# Patient Record
Sex: FEMALE | Race: AMERICAN INDIAN OR ALASKA NATIVE
[De-identification: names, ages, dates, MRNs, and addresses within clinical notes are randomized per-mention and may not be internally consistent; named-entity substitution may affect disease eponyms.]

---

## 2019-02-03 ENCOUNTER — HOSPITAL ENCOUNTER (INPATIENT)
Dept: HOSPITAL 5 - ED | Age: 49
LOS: 4 days | Discharge: HOME | DRG: 292 | End: 2019-02-07
Attending: INTERNAL MEDICINE | Admitting: INTERNAL MEDICINE
Payer: MEDICARE

## 2019-02-03 DIAGNOSIS — Z79.899: ICD-10-CM

## 2019-02-03 DIAGNOSIS — I27.20: ICD-10-CM

## 2019-02-03 DIAGNOSIS — I50.23: ICD-10-CM

## 2019-02-03 DIAGNOSIS — E83.42: ICD-10-CM

## 2019-02-03 DIAGNOSIS — Z98.51: ICD-10-CM

## 2019-02-03 DIAGNOSIS — Z79.82: ICD-10-CM

## 2019-02-03 DIAGNOSIS — M25.511: ICD-10-CM

## 2019-02-03 DIAGNOSIS — R18.8: ICD-10-CM

## 2019-02-03 DIAGNOSIS — I11.0: Primary | ICD-10-CM

## 2019-02-03 DIAGNOSIS — I42.0: ICD-10-CM

## 2019-02-03 DIAGNOSIS — E87.6: ICD-10-CM

## 2019-02-03 DIAGNOSIS — Z82.49: ICD-10-CM

## 2019-02-03 LAB
ALBUMIN SERPL-MCNC: 4 G/DL (ref 3.9–5)
ALT SERPL-CCNC: 20 UNITS/L (ref 7–56)
BILIRUB UR QL STRIP: (no result)
BLOOD UR QL VISUAL: (no result)
BUN SERPL-MCNC: 11 MG/DL (ref 7–17)
BUN/CREAT SERPL: 11 %
CALCIUM SERPL-MCNC: 9.4 MG/DL (ref 8.4–10.2)
HCT VFR BLD CALC: 31.4 % (ref 30.3–42.9)
HEMOLYSIS INDEX: 3
HGB BLD-MCNC: 9.2 GM/DL (ref 10.1–14.3)
MCHC RBC AUTO-ENTMCNC: 29 % (ref 30–34)
MCV RBC AUTO: 69 FL (ref 79–97)
MUCOUS THREADS #/AREA URNS HPF: (no result) /HPF
PH UR STRIP: 5 [PH] (ref 5–7)
PLATELET # BLD: 189 K/MM3 (ref 140–440)
PROT UR STRIP-MCNC: >500 MG/DL
RBC # BLD AUTO: 4.55 M/MM3 (ref 3.65–5.03)
RBC #/AREA URNS HPF: 3 /HPF (ref 0–6)
UROBILINOGEN UR-MCNC: 2 MG/DL (ref ?–2)
WBC #/AREA URNS HPF: 1 /HPF (ref 0–6)

## 2019-02-03 PROCEDURE — 93306 TTE W/DOPPLER COMPLETE: CPT

## 2019-02-03 PROCEDURE — 83880 ASSAY OF NATRIURETIC PEPTIDE: CPT

## 2019-02-03 PROCEDURE — 85027 COMPLETE CBC AUTOMATED: CPT

## 2019-02-03 PROCEDURE — 93010 ELECTROCARDIOGRAM REPORT: CPT

## 2019-02-03 PROCEDURE — 93005 ELECTROCARDIOGRAM TRACING: CPT

## 2019-02-03 PROCEDURE — 82550 ASSAY OF CK (CPK): CPT

## 2019-02-03 PROCEDURE — 82553 CREATINE MB FRACTION: CPT

## 2019-02-03 PROCEDURE — 85379 FIBRIN DEGRADATION QUANT: CPT

## 2019-02-03 PROCEDURE — 83735 ASSAY OF MAGNESIUM: CPT

## 2019-02-03 PROCEDURE — 71275 CT ANGIOGRAPHY CHEST: CPT

## 2019-02-03 PROCEDURE — 71045 X-RAY EXAM CHEST 1 VIEW: CPT

## 2019-02-03 PROCEDURE — 84484 ASSAY OF TROPONIN QUANT: CPT

## 2019-02-03 PROCEDURE — 87116 MYCOBACTERIA CULTURE: CPT

## 2019-02-03 PROCEDURE — 80053 COMPREHEN METABOLIC PANEL: CPT

## 2019-02-03 PROCEDURE — 85025 COMPLETE CBC W/AUTO DIFF WBC: CPT

## 2019-02-03 PROCEDURE — 36415 COLL VENOUS BLD VENIPUNCTURE: CPT

## 2019-02-03 PROCEDURE — 80048 BASIC METABOLIC PNL TOTAL CA: CPT

## 2019-02-03 PROCEDURE — 81001 URINALYSIS AUTO W/SCOPE: CPT

## 2019-02-03 NOTE — XRAY REPORT
FINAL REPORT



EXAM:  XR CHEST 1V AP



HISTORY:  Dyspnea 



COMPARISON:  None available. 



FINDINGS:  

Frontal view(s) of the chest obtained. Moderate severe cardiac silhouette enlargement. Lungs are ezequiel
sly clear. No pneumothorax. 



IMPRESSION:  

Prominent enlargement of the cardiac silhouette. Pericardial effusion is not excluded. Lungs are ezequiel
sly clear.

## 2019-02-03 NOTE — EMERGENCY DEPARTMENT REPORT
ED Shortness of Breath HPI





- General


Chief Complaint: Nausea/Vomiting/Diarrhea


Stated Complaint: N/V/D


Time Seen by Provider: 02/03/19 17:38


Source: EMS


Mode of arrival: Wheelchair


Limitations: No Limitations





- History of Present Illness


Initial Comments: 





Patient is a 43-year-old emergency room with complaints of nausea vomiting 

diarrhea, lower extremity swelling, shortness of breath, weakness and chest pain

rating to her right shoulder and abdominal distention 1 week.  Patient states 

her symptoms are worsening.  Patient states her chest pain is a 10 out of 10 and

is radiating to her right shoulder.  Patient states stabbing pain.  Patient 

states the pain is better with rest and worse with exertion and movement.  

Patient states the shortness of breath is severe and is worsening and is better 

with rest and worse with exertion.  Patient states history of CHF.  Patient 

states she hasn't changed her diet or missed any medications.


MD Complaint: shortness of breath, chest pain


-: Sudden


Severity: severe


Pain Scale: 10


Consistency: constant


Improves With: rest


Worsens With: exertion, movement


Known History Of: congestive heart failure


Associated Symptoms: chest pain, lower abdominal swelling, nausea/vomiting


Treatments Prior to Arrival: none





- Related Data


Home Oxygen Therapy: No


                                Home Medications











 Medication  Instructions  Recorded  Confirmed  Last Taken


 


Allopurinol 100 mg PO DAILY 02/04/19 02/04/19 Unknown


 


Aspirin [Aspir-Low] 81 mg PO DAILY 02/04/19 02/04/19 Unknown


 


Carvedilol [Coreg] 3.125 mg PO Q12HR 02/04/19 02/04/19 Unknown


 


Ferrous Sulfate 325 mg PO Q48HR 02/04/19 02/04/19 Unknown


 


Furosemide [Lasix TAB] 20 mg PO DAILY 02/04/19 02/04/19 Unknown


 


Mag-Oxide 400 mg PO DAILY 02/04/19 02/04/19 Unknown











                                    Allergies











Allergy/AdvReac Type Severity Reaction Status Date / Time


 


No Known Allergies Allergy   Unverified 02/03/19 17:35














ED Review of Systems


ROS: 


Stated complaint: N/V/D


Other details as noted in HPI





Constitutional: weakness.  denies: chills, fever


Eyes: denies: eye pain, eye discharge, vision change


ENT: denies: ear pain, throat pain


Respiratory: shortness of breath, SOB with exertion, SOB at rest.  denies: 

cough, wheezing


Cardiovascular: chest pain.  denies: palpitations


Endocrine: no symptoms reported


Gastrointestinal: nausea, vomiting, diarrhea.  denies: abdominal pain


Genitourinary: denies: urgency, dysuria, discharge


Musculoskeletal: denies: back pain, joint swelling, arthralgia


Skin: denies: rash, lesions


Neurological: denies: headache, weakness, paresthesias


Psychiatric: denies: anxiety, depression


Hematological/Lymphatic: denies: easy bleeding, easy bruising





ED Past Medical Hx





- Past Medical History


Previous Medical History?: Yes


Hx Hypertension: Yes


Hx Congestive Heart Failure: Yes


Additional medical history: CJHF





- Surgical History


Past Surgical History?: Yes


Additional Surgical History: BILATERAL ANKLES FX,TUBILIGATION





- Family History


Family history: no significant





- Social History


Smoking Status: Never Smoker


Substance Use Type: None





- Medications


Home Medications: 


                                Home Medications











 Medication  Instructions  Recorded  Confirmed  Last Taken  Type


 


Allopurinol 100 mg PO DAILY 02/04/19 02/04/19 Unknown History


 


Aspirin [Aspir-Low] 81 mg PO DAILY 02/04/19 02/04/19 Unknown History


 


Carvedilol [Coreg] 3.125 mg PO Q12HR 02/04/19 02/04/19 Unknown History


 


Ferrous Sulfate 325 mg PO Q48HR 02/04/19 02/04/19 Unknown History


 


Furosemide [Lasix TAB] 20 mg PO DAILY 02/04/19 02/04/19 Unknown History


 


Mag-Oxide 400 mg PO DAILY 02/04/19 02/04/19 Unknown History














ED Physical Exam





- General


Limitations: No Limitations


General appearance: alert, in no apparent distress





- Head


Head exam: Present: atraumatic, normocephalic





- Eye


Eye exam: Present: normal appearance, PERRL


Pupils: Present: normal accommodation





- ENT


ENT exam: Present: mucous membranes moist





- Neck


Neck exam: Present: normal inspection





- Respiratory


Respiratory exam: Present: normal lung sounds bilaterally.  Absent: respiratory 

distress





- Cardiovascular


Cardiovascular Exam: Present: regular rate, normal rhythm.  Absent: systolic 

murmur, diastolic murmur, rubs, gallop





- GI/Abdominal


GI/Abdominal exam: Present: soft, normal bowel sounds





- Extremities Exam


Extremities exam: Present: full ROM, pedal edema.  Absent: calf tenderness





- Back Exam


Back exam: Present: normal inspection





- Neurological Exam


Neurological exam: Present: alert, oriented X3





- Psychiatric


Psychiatric exam: Present: normal affect, normal mood





- Skin


Skin exam: Present: warm, dry, intact, normal color.  Absent: rash





ED Course


                                   Vital Signs











  02/03/19 02/03/19 02/03/19





  17:28 18:02 18:03


 


Temperature 98.3 F 98.6 F 


 


Pulse Rate 101 H 99 H 


 


Respiratory 20 19 19





Rate   


 


Blood Pressure 109/78  


 


Blood Pressure  99/74 





[Left]   


 


O2 Sat by Pulse 100 100 100





Oximetry   














  02/03/19 02/03/19 02/03/19





  18:46 19:16 23:17


 


Temperature   


 


Pulse Rate 100 H 99 H 99 H


 


Respiratory 16 17 





Rate   


 


Blood Pressure 99/74 109/79 


 


Blood Pressure   





[Left]   


 


O2 Sat by Pulse 98 98 





Oximetry   














  02/03/19





  23:18


 


Temperature 98.6 F


 


Pulse Rate 96 H


 


Respiratory 16





Rate 


 


Blood Pressure 111/76


 


Blood Pressure 





[Left] 


 


O2 Sat by Pulse 96





Oximetry 














- Reevaluation(s)


Reevaluation #1: 


discussed all results with patient.  Patient admitted to the hospitalist 

service.  Patient agrees with plan of care and admission.


02/03/19 21:33








- Consultations


Consultation #1: 


Hospital was consulted for admission.  Hospitalist to admit patient.  

Hospitalist to assume care patient.


02/03/19 21:34








ED Medical Decision Making





- Lab Data


Result diagrams: 


                                 02/03/19 17:58





                                 02/03/19 17:58





- EKG Data


-: EKG Interpreted by Me


EKG shows normal: sinus rhythm, axis, intervals, QRS complexes, ST-T waves


Rate: tachycardia





- Radiology Data


Radiology results: report reviewed, image reviewed


interpreted by me: 





Cardiomegaly noted on chest x-ray. no acute findings





FINAL REPORT 





EXAM: CT ANGIO CHEST 





HISTORY: sob 





COMPARISON: Chest x-ray from the same date. 





TECHNIQUE: Contiguous axial images were obtained. Additional sagittal and 

coronal reformatted 


images were obtained. Administration of IV contrast given per institution 

protocol. Images submitted


for interpretation. Max intensity projection images. 100 cc Omnipaque 350. 





FINDINGS: 


Prominent cardiac enlargement. Small pericardial effusion measuring 1.5 

centimeters in greatest 


thickness. No pulmonary embolus. No pathologically enlarged intrathoracic or 

axillary lymph nodes. 





Tracheobronchial tree is patent. Mild linear atelectasis at the lung bases. 

Trace right-sided 


pleural effusion. Mild-to-moderate degenerative changes of the thoracic spine. 

Mild diffuse body 


wall edema. Small to moderate amount of free fluid in the visualized upper 

abdomen. Reflux of 


contrast with hepatic veins concerning for right ventricular dysfunction. 

Nodular thickening of 


adrenal glands. 





IMPRESSION: 


No pulmonary embolus. 





Prominent cardiac enlargement with small pericardial effusion. 





Probable minimal basilar congestion. Mild septal thickening lung bases and trace

 right-sided 


pleural effusion. 





Mild diffuse body wall edema and small to moderate amount of ascites in the 

upper abdomen. 





- Medical Decision Making





He is a 48-year-old female that presents emergency room with multiple 

complaints.  Patient found to being a CHF exacerbation.  Patient was also found 

to have elevated d-dimer and with the symptoms of shortness of breath a CT scan 

of the lungs was done.  CTA was negative for PE.  Chest x-ray shows 

cardiomegaly.  BNP is elevated.  Patient has complained of lower cavity edema.





- Differential Diagnosis


sob. cp chf exac. volume overload. edema


Critical Care Time: Yes


Critical care attestation.: 


If time is entered above; I have spent that time in minutes in the direct care 

of this critically ill patient, excluding procedure time.





Critical Care Time: 





45 minutes





ED Disposition


Clinical Impression: 


 SOB (shortness of breath), Leg edema, Elevated d-dimer, Elevated brain 

natriuretic peptide (BNP) level





Chest pain


Qualifiers:


 Chest pain type: unspecified Qualified Code(s): R07.9 - Chest pain, unspecified





CHF (congestive heart failure)


Qualifiers:


 Heart failure type: unspecified Heart failure chronicity: acute on chronic Qu

alified Code(s): I50.9 - Heart failure, unspecified





Disposition: DC-09 OP ADMIT IP TO THIS HOSP


Is pt being admited?: Yes


Does the pt Need Aspirin: No


Condition: Critical


Time of Disposition: 21:27

## 2019-02-03 NOTE — CAT SCAN REPORT
FINAL REPORT



EXAM:  CT ANGIO CHEST



HISTORY:  sob 



COMPARISON:  Chest x-ray from the same date. 



TECHNIQUE:  Contiguous axial images were obtained. Additional sagittal and coronal reformatted images
 were obtained. Administration of IV contrast given per institution protocol. Images submitted for in
terpretation. Max intensity projection images. 100 cc Omnipaque 350. 



FINDINGS:  

Prominent cardiac enlargement. Small pericardial effusion measuring 1.5 centimeters in greatest thick
ness. No pulmonary embolus. No pathologically enlarged intrathoracic or axillary lymph nodes. 



Tracheobronchial tree is patent. Mild linear atelectasis at the lung bases. Trace right-sided pleural
 effusion. Mild-to-moderate degenerative changes of the thoracic spine. Mild diffuse body wall edema.
 Small to moderate amount of free fluid in the visualized upper abdomen. Reflux of contrast with hepa
tic veins concerning for right ventricular dysfunction. Nodular thickening of adrenal glands. 



IMPRESSION:  

No pulmonary embolus. 



Prominent cardiac enlargement with small pericardial effusion. 



Probable minimal basilar congestion. Mild septal thickening lung bases and trace right-sided pleural 
effusion. 



Mild diffuse body wall edema and small to moderate amount of ascites in the upper abdomen.

## 2019-02-03 NOTE — HISTORY AND PHYSICAL REPORT
History of Present Illness


Date of examination: 02/03/19


History of present illness: 


48-year-old female with history of CHF, hypertension comes emergency room with 

complaints of swelling in her stomach and lower extremity edema, shortness of 

breath 2 weeks.  Complaining of dyspnea and exertion, orthopnea.  Also complai

edin of right shoulder pain, stabbing, intermittent over 2 hours, no radiation, 

intensity 4/5, cannot identify exacerbating or relieving factors


Review of systems


Constitutional: no  weight loss, chills, fever


Ears, eyes, nose, mouth and throat: no nasal congestion, no nasal discharge, no 

sinus pressure, no vision change, no red eye.


Neck: No neck pain or rigidity.


Cardiovascular: no palpitations, chest pain


Respiratory: no cough, +shortness of breath


Gastrointestinal: no hematochezia, abdominal pain


Genitourinary : no  frequency , no hematuria


Musculoskeletal: no joint swelling or muscle ache 


Integumentary: no rash, no pruritis


Neurological: no parathesias, no focal weakness


Endocrine: no cold or heat intolerance, no polyuria or polydipsia


Hematologic/Lymphatic: no easy bruising, no easy bleeding, no gland swelling


Allergic/Immunologic: no urticaria, no angioedema.





PAST MEDICAL HISTORY: CHF, hypertension 





PAST SURGICAL HISTORY: Tubal ligation, ankle





SOCIAL HISTORY: Denies alcohol,  drugs, tobacco





FAMILY HISTORY: Hypertension








Medications and Allergies


                                    Allergies











Allergy/AdvReac Type Severity Reaction Status Date / Time


 


No Known Allergies Allergy   Unverified 02/03/19 17:35











                                Home Medications











 Medication  Instructions  Recorded  Confirmed  Last Taken  Type


 


Ferrous Sulfate 325 mg PO Q48HR 02/04/19 02/04/19 Unknown History


 


Mag-Oxide 400 mg PO DAILY 02/04/19 02/04/19 Unknown History


 


RX: Allopurinol 100 mg PO DAILY 02/04/19 02/04/19 Unknown History


 


RX: Aspirin [Aspir-Low] 81 mg PO DAILY 02/04/19 02/04/19 Unknown History


 


RX: Carvedilol [Coreg] 3.125 mg PO Q12HR 02/04/19 02/04/19 Unknown History


 


RX: Furosemide [Lasix TAB] 20 mg PO DAILY #30 tablet 02/07/19  Unknown Rx


 


RX: Lisinopril [Zestril TAB] 2.5 mg PO QDAY #30 tab 02/07/19  Unknown Rx


 


RX: Spironolactone [Aldactone] 25 mg PO QDAY #30 tablet 02/07/19  Unknown Rx














Exam





- Physical Exam


Narrative exam: 


General Apperance: The patient lying in bed,  breathing comfortable 


HEENT: Normocephalic, atraumatic.  Pupils equally round and reactive to light, 

EOMI, no sclericterus or JVD or thyromegaly or nodule.  , no carotid bruit, 

mucous membranes moist, no exudate or erythema, +jvd


Heart: S1-S2, regular is rhythm


Lungs: Crackles auscultation bilaterally, breathing comfortable


Abdomen: Positive bowel sounds, soft, nontender, nondistended, no organomegaly


Extremities:+ edema, NO  cyanosis clubbing


Skin: no rash, nodule, warm and dry


Neuro: cranial nerves 2-12 intact, speech is fluent, motor/sensory intact








- Constitutional


Vitals: 


                                        











Temp Pulse Resp BP Pulse Ox


 


 98.6 F   99 H  17   109/79   98 


 


 02/03/19 18:02  02/03/19 19:16  02/03/19 19:16  02/03/19 19:16  02/03/19 19:16














Results





- Labs


CBC & Chem 7: 


                                 02/06/19 04:33





                                 02/06/19 04:33


Labs: 


                              Abnormal lab results











  02/03/19 02/03/19 02/03/19 Range/Units





  17:58 17:58 17:58 


 


Hgb  9.2 L    (10.1-14.3)  gm/dl


 


MCV  69 L    (79-97)  fl


 


MCH  20 L    (28-32)  pg


 


MCHC  29 L    (30-34)  %


 


RDW  22.7 H    (13.2-15.2)  %


 


D-Dimer   1952.64 H   (0-234)  ng/mlDDU


 


Total Bilirubin    3.20 H  (0.1-1.2)  mg/dL


 


NT-Pro-B Natriuret Pep     (0-450)  pg/mL














  02/03/19 Range/Units





  18:06 


 


Hgb   (10.1-14.3)  gm/dl


 


MCV   (79-97)  fl


 


MCH   (28-32)  pg


 


MCHC   (30-34)  %


 


RDW   (13.2-15.2)  %


 


D-Dimer   (0-234)  ng/mlDDU


 


Total Bilirubin   (0.1-1.2)  mg/dL


 


NT-Pro-B Natriuret Pep  8051 H  (0-450)  pg/mL














- Imaging and Cardiology


EKG: image reviewed


Chest x-ray: report reviewed


CT scan - chest: report reviewed





Assessment and Plan


Assessment


CHF exacerbation, acute on chronic, probably diastolic


Hypertension





Plan


Admit to medicine


Diurese with IV Lasix


start Beta blocker, ACE inhibitor, aspirin 


Check cardiac enzymes, echo


Monitor I's and O's, daily weight


Consult cardiology, DVT prophylaxis

## 2019-02-04 LAB
BASOPHILS # (AUTO): 0 K/MM3 (ref 0–0.1)
BASOPHILS NFR BLD AUTO: 0.6 % (ref 0–1.8)
BILIRUB UR QL STRIP: (no result)
BLOOD UR QL VISUAL: (no result)
BUN SERPL-MCNC: 12 MG/DL (ref 7–17)
BUN/CREAT SERPL: 12 %
CALCIUM SERPL-MCNC: 9.1 MG/DL (ref 8.4–10.2)
EOSINOPHIL # BLD AUTO: 0.1 K/MM3 (ref 0–0.4)
EOSINOPHIL NFR BLD AUTO: 0.9 % (ref 0–4.3)
HCT VFR BLD CALC: 29.3 % (ref 30.3–42.9)
HEMOLYSIS INDEX: 0
HGB BLD-MCNC: 8.8 GM/DL (ref 10.1–14.3)
LYMPHOCYTES # BLD AUTO: 1.2 K/MM3 (ref 1.2–5.4)
LYMPHOCYTES NFR BLD AUTO: 19.1 % (ref 13.4–35)
MCHC RBC AUTO-ENTMCNC: 30 % (ref 30–34)
MCV RBC AUTO: 69 FL (ref 79–97)
MONOCYTES # (AUTO): 0.6 K/MM3 (ref 0–0.8)
MONOCYTES % (AUTO): 9.4 % (ref 0–7.3)
MUCOUS THREADS #/AREA URNS HPF: (no result) /HPF
PH UR STRIP: 5 [PH] (ref 5–7)
PLATELET # BLD: 168 K/MM3 (ref 140–440)
PROT UR STRIP-MCNC: (no result) MG/DL
RBC # BLD AUTO: 4.27 M/MM3 (ref 3.65–5.03)
RBC #/AREA URNS HPF: 2 /HPF (ref 0–6)
UROBILINOGEN UR-MCNC: 4 MG/DL (ref ?–2)
WBC #/AREA URNS HPF: < 1 /HPF (ref 0–6)

## 2019-02-04 RX ADMIN — Medication SCH ML: at 21:51

## 2019-02-04 RX ADMIN — FUROSEMIDE SCH MG: 10 INJECTION, SOLUTION INTRAVENOUS at 06:26

## 2019-02-04 RX ADMIN — ASPIRIN SCH MG: 81 TABLET, CHEWABLE ORAL at 09:03

## 2019-02-04 RX ADMIN — CARVEDILOL SCH MG: 3.12 TABLET, FILM COATED ORAL at 21:51

## 2019-02-04 RX ADMIN — FUROSEMIDE SCH MG: 10 INJECTION, SOLUTION INTRAVENOUS at 18:20

## 2019-02-04 RX ADMIN — LISINOPRIL SCH MG: 5 TABLET ORAL at 09:01

## 2019-02-04 RX ADMIN — CARVEDILOL SCH MG: 3.12 TABLET, FILM COATED ORAL at 09:02

## 2019-02-04 RX ADMIN — Medication SCH ML: at 09:02

## 2019-02-04 RX ADMIN — CARVEDILOL SCH: 3.12 TABLET, FILM COATED ORAL at 22:15

## 2019-02-04 RX ADMIN — MILRINONE LACTATE IN DEXTROSE SCH MLS/HR: 200 INJECTION, SOLUTION INTRAVENOUS at 13:35

## 2019-02-04 RX ADMIN — ENOXAPARIN SODIUM SCH MG: 100 INJECTION SUBCUTANEOUS at 09:02

## 2019-02-04 NOTE — CONSULTATION
Addendum entered and electronically signed by TARAH ABREU MD  02/04/19 18:07:







Patient is admitted with heart failure except, fatigue, shortness of breath, low

er extremity edema, ascites.  Patient's echocardiogram demonstrates an end-stage

dilated cardiomyopathy, with left ventricle ejection fraction less than 10%.  In

addition to optimal guideline directed medical therapy, we will add a trial of 

intravenous milrinone.





Original Note:








History of Present Illness


Consult date: 02/04/19


Consult reason: congestive heart failure


History of present illness: 





Patient is a 48 year old woman with a long standing history of dilated c

ardiomyopathy which is followed by [facility]. Patient was brought to this 

hospital with complaints of shortness of breath, abdominal distention, lower 

extremity edema, admitted with CHF exacerbation. Cardiac consultation was 

requested. Patient admits she ran out of lasix 2 weeks ago. She denies chest 

pain and palpitations. Chest CT scan reports evidence of moderate ascities. No 

evidence of PE. An ECG is sinus tachycardia, no acute ischemic changes. 





Medications and Allergies


                                    Allergies











Allergy/AdvReac Type Severity Reaction Status Date / Time


 


No Known Allergies Allergy   Unverified 02/03/19 17:35











                                Home Medications











 Medication  Instructions  Recorded  Confirmed  Last Taken  Type


 


Allopurinol 100 mg PO DAILY 02/04/19 02/04/19 Unknown History


 


Aspirin [Aspir-Low] 81 mg PO DAILY 02/04/19 02/04/19 Unknown History


 


Carvedilol [Coreg] 3.125 mg PO Q12HR 02/04/19 02/04/19 Unknown History


 


Ferrous Sulfate 325 mg PO Q48HR 02/04/19 02/04/19 Unknown History


 


Furosemide [Lasix TAB] 20 mg PO DAILY 02/04/19 02/04/19 Unknown History


 


Mag-Oxide 400 mg PO DAILY 02/04/19 02/04/19 Unknown History











Active Meds: 


Active Medications





Acetaminophen (Tylenol)  650 mg PO Q4H PRN


   PRN Reason: Pain MILD(1-3)/Fever >100.5/HA


   Last Admin: 02/04/19 06:36 Dose:  650 mg


   Documented by: 


Aspirin (Baby Aspirin)  81 mg PO QDAY [facility]


   Last Admin: 02/04/19 09:03 Dose:  81 mg


   Documented by: 


Carvedilol (Coreg)  3.125 mg PO BID [facility]


   Last Admin: 02/04/19 09:02 Dose:  3.125 mg


   Documented by: 


Enoxaparin Sodium (Lovenox)  40 mg SUB-Q QDAY@1000 [facility]


   Last Admin: 02/04/19 09:02 Dose:  40 mg


   Documented by: 


Furosemide (Lasix)  40 mg IV BID@0600,1800 [facility]


   Last Admin: 02/04/19 06:26 Dose:  40 mg


   Documented by: 


Lisinopril (Zestril)  2.5 mg PO QDAY [facility]


   Last Admin: 02/04/19 09:01 Dose:  2.5 mg


   Documented by: 


Ondansetron HCl (Zofran)  4 mg IV Q8H PRN


   PRN Reason: Nausea And Vomiting


Sodium Chloride (Sodium Chloride Flush Syringe 10 Ml)  10 ml IV BID [facility]


   Last Admin: 02/04/19 09:02 Dose:  10 ml


   Documented by: 


Sodium Chloride (Sodium Chloride Flush Syringe 10 Ml)  10 ml IV PRN PRN


   PRN Reason: LINE FLUSH











Physical Examination


                                   Vital Signs











Temp Pulse Resp BP Pulse Ox


 


 98.3 F   101 H  20   109/78   100 


 


 02/03/19 17:28  02/03/19 17:28  02/03/19 17:28  02/03/19 17:28  02/03/19 17:28











General appearance: no acute distress


HEENT: Positive: PERRL


Neck: Positive: trachea midline


Cardiac: Positive: Reg Rate and Rhythm


Lungs: Positive: Decreased Breath Sounds


Neuro: Positive: Grossly Intact


Abdomen: Positive: Distended


Extremities: Present: +1 Edema





Results





                                 02/04/19 04:36





                                 02/04/19 04:36


                                 Cardiac Enzymes











  02/03/19 02/03/19 02/04/19 Range/Units





  17:58 17:58 04:36 


 


AST   26   (5-40)  units/L


 


CK-MB (CK-2)  1.9   1.6  (0.0-4.0)  ng/mL








                                       CBC











  02/03/19 02/04/19 Range/Units





  17:58 04:36 


 


WBC  6.1  6.1  (4.5-11.0)  K/mm3


 


RBC  4.55  4.27  (3.65-5.03)  M/mm3


 


Hgb  9.2 L  8.8 L  (10.1-14.3)  gm/dl


 


Hct  31.4  29.3 L  (30.3-42.9)  %


 


Plt Count  189  168  (140-440)  K/mm3


 


Lymph #  Np  1.2  


 


Mono #  Np  0.6  


 


Eos #  Np  0.1  


 


Baso #  Np  0.0  








                          Comprehensive Metabolic Panel











  02/03/19 02/04/19 Range/Units





  17:58 04:36 


 


Sodium  142  141  (137-145)  mmol/L


 


Potassium  4.3  4.1  (3.6-5.0)  mmol/L


 


Chloride  101.8  102.0  ()  mmol/L


 


Carbon Dioxide  26  26  (22-30)  mmol/L


 


BUN  11  12  (7-17)  mg/dL


 


Creatinine  1.0  1.0  (0.7-1.2)  mg/dL


 


Glucose  94  90  ()  mg/dL


 


Calcium  9.4  9.1  (8.4-10.2)  mg/dL


 


AST  26   (5-40)  units/L


 


ALT  20   (7-56)  units/L


 


Alkaline Phosphatase  59   ()  units/L


 


Total Protein  8.0   (6.3-8.2)  g/dL


 


Albumin  4.0   (3.9-5)  g/dL














Assessment and Plan





Acute systolic heart failure


   s/t to noncompliance with medications


Ascities

## 2019-02-04 NOTE — PROGRESS NOTE
Assessment and Plan


Assessment and plan: 


Patient is a 49 yo woman history of CHF, hypertension who presents to [facility] ED 

with swelling in her stomach and lower extremity edema, shortness of breath 2 

weeks, and orthopnea.  Also complaining of right shoulder pain, stabbing, 

intermittent over 2 hours, no radiation, intensity 4/5, cannot identify ex

acerbating or relieving factors





-CHF exacerbation, acute on chronic, probably diastolic, Diuresis with IV Lasix,

ECHO pending, 


-Right shoulder pains: supportive care


-Hypertension, Check cardiac enzymes,





History


Interval history: 


Patient was seen and examined. Follow-up on current diagnosis of SOB. Overnight 

uneventful. Patient denies any chest pain, nausea/vomiting or severe headaches. 

Imaging, nursing note, chart, labs and old chart reviewed. Discussed with 

patient. 





Hospitalist Physical





- Physical exam


Narrative exam: 


Gen: ill appearing,  NAD, Awake, Alert, Orientated 


HEENT: NCAT, EOMI, PERRL, OP Clear 


Neck: supple, no adenopathy, no thyromegaly, no JVD 


CVS/Heart: RRR, normal S1S2, pulses present bilaterally 


Chest/Lungs: diminished bs bilateral, Symmetrical chest expansion, good air 

entry bilaterally 


GI/Abdomen: abdominal distension, good bowel sounds, no guarding or rebound 


/Bladder: no suprapubic tenderness, no CVA or paraspinal tenderness 


Extermity/Skin: ble edema, no obvious rash 


MSK: FROM x 4 


Neuro: CN 2-12 grossly intact, no new focal deficits 


Psych: calm 








- Constitutional


Vitals: 


                                        











Temp Pulse Resp BP Pulse Ox


 


 97.9 F   103 H  20   102/68   100 


 


 02/04/19 07:46  02/04/19 07:46  02/04/19 07:46  02/04/19 07:46  02/04/19 10:13











General appearance: Present: no acute distress





Results





- Labs


CBC & Chem 7: 


                                 02/04/19 04:36





                                 02/04/19 04:36


Labs: 


                             Laboratory Last Values











WBC  6.1 K/mm3 (4.5-11.0)   02/04/19  04:36    


 


RBC  4.27 M/mm3 (3.65-5.03)   02/04/19  04:36    


 


Hgb  8.8 gm/dl (10.1-14.3)  L  02/04/19  04:36    


 


Hct  29.3 % (30.3-42.9)  L  02/04/19  04:36    


 


MCV  69 fl (79-97)  L  02/04/19  04:36    


 


MCH  21 pg (28-32)  L  02/04/19  04:36    


 


MCHC  30 % (30-34)   02/04/19  04:36    


 


RDW  22.9 % (13.2-15.2)  H  02/04/19  04:36    


 


Plt Count  168 K/mm3 (140-440)   02/04/19  04:36    


 


Lymph % (Auto)  19.1 % (13.4-35.0)   02/04/19  04:36    


 


Mono % (Auto)  9.4 % (0.0-7.3)  H  02/04/19  04:36    


 


Eos % (Auto)  0.9 % (0.0-4.3)   02/04/19  04:36    


 


Baso % (Auto)  0.6 % (0.0-1.8)   02/04/19  04:36    


 


Lymph #  1.2 K/mm3 (1.2-5.4)   02/04/19  04:36    


 


Mono #  0.6 K/mm3 (0.0-0.8)   02/04/19  04:36    


 


Eos #  0.1 K/mm3 (0.0-0.4)   02/04/19  04:36    


 


Baso #  0.0 K/mm3 (0.0-0.1)   02/04/19  04:36    


 


Seg Neutrophils %  70.0 % (40.0-70.0)   02/04/19  04:36    


 


Seg Neutrophils #  4.3 K/mm3 (1.8-7.7)   02/04/19  04:36    


 


D-Dimer  1952.64 ng/mlDDU (0-234)  H  02/03/19  17:58    


 


Sodium  141 mmol/L (137-145)   02/04/19  04:36    


 


Potassium  4.1 mmol/L (3.6-5.0)   02/04/19  04:36    


 


Chloride  102.0 mmol/L ()   02/04/19  04:36    


 


Carbon Dioxide  26 mmol/L (22-30)   02/04/19  04:36    


 


Anion Gap  17 mmol/L  02/04/19  04:36    


 


BUN  12 mg/dL (7-17)   02/04/19  04:36    


 


Creatinine  1.0 mg/dL (0.7-1.2)   02/04/19  04:36    


 


Estimated GFR  > 60 ml/min  02/04/19  04:36    


 


BUN/Creatinine Ratio  12 %  02/04/19  04:36    


 


Glucose  90 mg/dL ()   02/04/19  04:36    


 


Calcium  9.1 mg/dL (8.4-10.2)   02/04/19  04:36    


 


Total Bilirubin  3.20 mg/dL (0.1-1.2)  H  02/03/19  17:58    


 


AST  26 units/L (5-40)   02/03/19  17:58    


 


ALT  20 units/L (7-56)   02/03/19  17:58    


 


Alkaline Phosphatase  59 units/L ()   02/03/19  17:58    


 


Total Creatine Kinase  95 units/L ()   02/04/19  04:36    


 


CK-MB (CK-2)  1.6 ng/mL (0.0-4.0)   02/04/19  04:36    


 


CK-MB (CK-2) Rel Index  1.6  (0-4)   02/04/19  04:36    


 


Troponin T  < 0.010 ng/mL (0.00-0.029)   02/04/19  04:36    


 


NT-Pro-B Natriuret Pep  8051 pg/mL (0-450)  H  02/03/19  18:06    


 


Total Protein  8.0 g/dL (6.3-8.2)   02/03/19  17:58    


 


Albumin  4.0 g/dL (3.9-5)   02/03/19  17:58    


 


Albumin/Globulin Ratio  1.0 %  02/03/19  17:58    


 


Urine Color  Sabiha  (Yellow)   02/03/19  Unknown


 


Urine Turbidity  Clear  (Clear)   02/03/19  Unknown


 


Urine pH  5.0  (5.0-7.0)   02/03/19  Unknown


 


Ur Specific Gravity  1.027  (1.003-1.030)   02/03/19  Unknown


 


Urine Protein  >500 mg/dL (Negative)   02/03/19  Unknown


 


Urine Glucose (UA)  50 mg/dL (Negative)   02/03/19  Unknown


 


Urine Ketones  Tr mg/dL (Negative)   02/03/19  Unknown


 


Urine Blood  Neg  (Negative)   02/03/19  Unknown


 


Urine Nitrite  Neg  (Negative)   02/03/19  Unknown


 


Urine Bilirubin  Neg  (Negative)   02/03/19  Unknown


 


Urine Urobilinogen  2.0 mg/dL (<2.0)   02/03/19  Unknown


 


Ur Leukocyte Esterase  Neg  (Negative)   02/03/19  Unknown


 


Urine WBC (Auto)  1.0 /HPF (0.0-6.0)   02/03/19  Unknown


 


Urine RBC (Auto)  3.0 /HPF (0.0-6.0)   02/03/19  Unknown


 


U Epithel Cells (Auto)  2.0 /HPF (0-13.0)   02/03/19  Unknown


 


Urine Mucus  Few /HPF  02/03/19  Unknown

## 2019-02-05 LAB
BUN SERPL-MCNC: 12 MG/DL (ref 7–17)
BUN/CREAT SERPL: 13 %
CALCIUM SERPL-MCNC: 8.5 MG/DL (ref 8.4–10.2)
HCT VFR BLD CALC: 30.9 % (ref 30.3–42.9)
HEMOLYSIS INDEX: 2
HGB BLD-MCNC: 9.1 GM/DL (ref 10.1–14.3)
MCHC RBC AUTO-ENTMCNC: 30 % (ref 30–34)
MCV RBC AUTO: 68 FL (ref 79–97)
PLATELET # BLD: 166 K/MM3 (ref 140–440)
RBC # BLD AUTO: 4.52 M/MM3 (ref 3.65–5.03)

## 2019-02-05 RX ADMIN — CARVEDILOL SCH MG: 6.25 TABLET, FILM COATED ORAL at 11:00

## 2019-02-05 RX ADMIN — FUROSEMIDE SCH MG: 10 INJECTION, SOLUTION INTRAVENOUS at 17:30

## 2019-02-05 RX ADMIN — ASPIRIN SCH MG: 81 TABLET, CHEWABLE ORAL at 11:00

## 2019-02-05 RX ADMIN — Medication SCH ML: at 10:59

## 2019-02-05 RX ADMIN — FUROSEMIDE SCH: 10 INJECTION, SOLUTION INTRAVENOUS at 05:14

## 2019-02-05 RX ADMIN — FUROSEMIDE SCH: 10 INJECTION, SOLUTION INTRAVENOUS at 17:32

## 2019-02-05 RX ADMIN — Medication SCH ML: at 21:24

## 2019-02-05 RX ADMIN — MILRINONE LACTATE IN DEXTROSE SCH MLS/HR: 200 INJECTION, SOLUTION INTRAVENOUS at 02:47

## 2019-02-05 RX ADMIN — LISINOPRIL SCH MG: 5 TABLET ORAL at 11:00

## 2019-02-05 RX ADMIN — ENOXAPARIN SODIUM SCH MG: 100 INJECTION SUBCUTANEOUS at 10:59

## 2019-02-05 RX ADMIN — MILRINONE LACTATE IN DEXTROSE SCH MLS/HR: 200 INJECTION, SOLUTION INTRAVENOUS at 17:50

## 2019-02-05 RX ADMIN — SPIRONOLACTONE SCH MG: 25 TABLET ORAL at 11:00

## 2019-02-05 RX ADMIN — CARVEDILOL SCH: 6.25 TABLET, FILM COATED ORAL at 21:24

## 2019-02-05 NOTE — PROGRESS NOTE
Addendum entered and electronically signed by TARAH ABREU MD  02/05/19 11:54:







Patient is diuresing well, feeling much better this morning after initiation of 

intravenous inotropic therapy.  Continue current management.





Original Note:








Assessment and Plan





Acute systolic heart failure


   s/t to noncompliance with medications


Ascities, moderate on CTA


Hypokalemia





An echocardiogram demonstrates an end-stage dilated cardiomyopathy, with left 

ventricle ejection fraction less than 10%. 





Aggressive medical therapy for systolic heart failure to include a trial of 

intravenous milrinone.


Beta blockers increased for suppression of NSVT.





Subjective


Date of service: 02/05/19


Interval history: 





Patient has no complaints. Admits she is diuresing well.


5 beat NSVT seen on telemetry overnight.





Objective


                                   Vital Signs











  Temp Pulse Resp BP BP Pulse Ox


 


 02/05/19 08:12  98.9 F  93 H  20  106/68   100


 


 02/05/19 04:16  98.0 F  79  20  91/65   97


 


 02/04/19 23:36  97.7 F  98 H  18  96/64   95


 


 02/04/19 21:07       95


 


 02/04/19 20:06   100 H    


 


 02/04/19 16:07  98.0 F  100 H  20   104/68  100


 


 02/04/19 11:19  98.0 F  92 H  20  98/68   98


 


 02/04/19 10:13       100














- Physical Examination


General: No Apparent Distress


HEENT: Positive: PERRL


Neck: Positive: trachea midline


Cardiac: Positive: Reg Rate and Rhythm


Lungs: Positive: Decreased Breath Sounds


Neuro: Positive: Grossly Intact


Abdomen: Positive: Distended


Extremities: Present: +1 Edema





- Labs and Meds


                                       CBC











  02/05/19 Range/Units





  05:26 


 


WBC  6.0  (4.5-11.0)  K/mm3


 


RBC  4.52  (3.65-5.03)  M/mm3


 


Hgb  9.1 L  (10.1-14.3)  gm/dl


 


Hct  30.9  (30.3-42.9)  %


 


Plt Count  166  (140-440)  K/mm3








                          Comprehensive Metabolic Panel











  02/05/19 Range/Units





  05:46 


 


Sodium  141  (137-145)  mmol/L


 


Potassium  3.2 L D  (3.6-5.0)  mmol/L


 


Chloride  99.3  ()  mmol/L


 


Carbon Dioxide  27  (22-30)  mmol/L


 


BUN  12  (7-17)  mg/dL


 


Creatinine  0.9  (0.7-1.2)  mg/dL


 


Glucose  96  ()  mg/dL


 


Calcium  8.5  (8.4-10.2)  mg/dL

## 2019-02-05 NOTE — PROGRESS NOTE
Assessment and Plan


Assessment and plan: 


Patient is a 49 yo woman history of CHF, hypertension who presents to [facility] ED 

with swelling in her stomach and lower extremity edema, 


shortness of breath 2 weeks, and orthopnea.   





--Hypokalemia ; replenished per protocol and monitor levels 





--Acute on chronic systolic heart failure;


Continue anti-failure medications, ejection fraction less than 10%


On milrinone drip per cardiology





--Dilated cardiomyopathy; continue current anti-failure medications


Low-sodium diet, fluid restriction, cardiology following





--Moderate ascites; due to fluid overload


Continue diuretics, anti-failure medications





--Hypertension; well controlled


Continue current antihypertensives and when necessary medications





--DVT prophylaxis; Lovenox





Closely monitor the patient and adjust management as needed


Discharge planning per case management, possible home health at discharge














History


Interval history: 


Patient seen and examined medical records reviewed


Patient feels slightly better no new complaints


Severe dilated cardiomyopathy, on anti-failure medications


Alert awake and responding appropriately


Vital signs reviewed





Hospitalist Physical





- Constitutional


Vitals: 


                                        











Temp Pulse Resp BP Pulse Ox


 


 98.9 F   93 H  20   106/68   100 


 


 02/05/19 08:12  02/05/19 08:12  02/05/19 08:12  02/05/19 08:12  02/05/19 08:57











General appearance: Present: mild distress, well-nourished, obese





- EENT


Eyes: Present: PERRL, EOM intact





- Neck


Neck: Present: supple, normal ROM





- Respiratory


Respiratory effort: normal


Respiratory: bilateral: diminished, rales, negative: rhonchi, wheezing





- Cardiovascular


Rhythm: regular


Heart Sounds: Present: S1 & S2





- Extremities


Extremities: no ischemia


Extremity abnormal: edema





- Abdominal


General gastrointestinal: soft, non-tender, non-distended, normal bowel sounds, 

other (ascites)





- Integumentary


Integumentary: Present: clear, warm





- Psychiatric


Psychiatric: appropriate mood/affect, cooperative





- Neurologic


Neurologic: CNII-XII intact, moves all extremities





Results





- Labs


CBC & Chem 7: 


                                 02/05/19 05:26





                                 02/05/19 05:46


Labs: 


                             Laboratory Last Values











WBC  6.0 K/mm3 (4.5-11.0)   02/05/19  05:26    


 


RBC  4.52 M/mm3 (3.65-5.03)   02/05/19  05:26    


 


Hgb  9.1 gm/dl (10.1-14.3)  L  02/05/19  05:26    


 


Hct  30.9 % (30.3-42.9)   02/05/19  05:26    


 


MCV  68 fl (79-97)  L  02/05/19  05:26    


 


MCH  20 pg (28-32)  L  02/05/19  05:26    


 


MCHC  30 % (30-34)   02/05/19  05:26    


 


RDW  23.0 % (13.2-15.2)  H  02/05/19  05:26    


 


Plt Count  166 K/mm3 (140-440)   02/05/19  05:26    


 


Lymph % (Auto)  19.1 % (13.4-35.0)   02/04/19  04:36    


 


Mono % (Auto)  9.4 % (0.0-7.3)  H  02/04/19  04:36    


 


Eos % (Auto)  0.9 % (0.0-4.3)   02/04/19  04:36    


 


Baso % (Auto)  0.6 % (0.0-1.8)   02/04/19  04:36    


 


Lymph #  1.2 K/mm3 (1.2-5.4)   02/04/19  04:36    


 


Mono #  0.6 K/mm3 (0.0-0.8)   02/04/19  04:36    


 


Eos #  0.1 K/mm3 (0.0-0.4)   02/04/19  04:36    


 


Baso #  0.0 K/mm3 (0.0-0.1)   02/04/19  04:36    


 


Seg Neutrophils %  70.0 % (40.0-70.0)   02/04/19  04:36    


 


Seg Neutrophils #  4.3 K/mm3 (1.8-7.7)   02/04/19  04:36    


 


D-Dimer  1952.64 ng/mlDDU (0-234)  H  02/03/19  17:58    


 


Sodium  141 mmol/L (137-145)   02/05/19  05:46    


 


Potassium  3.2 mmol/L (3.6-5.0)  L D 02/05/19  05:46    


 


Chloride  99.3 mmol/L ()   02/05/19  05:46    


 


Carbon Dioxide  27 mmol/L (22-30)   02/05/19  05:46    


 


Anion Gap  18 mmol/L  02/05/19  05:46    


 


BUN  12 mg/dL (7-17)   02/05/19  05:46    


 


Creatinine  0.9 mg/dL (0.7-1.2)   02/05/19  05:46    


 


Estimated GFR  > 60 ml/min  02/05/19  05:46    


 


BUN/Creatinine Ratio  13 %  02/05/19  05:46    


 


Glucose  96 mg/dL ()   02/05/19  05:46    


 


Calcium  8.5 mg/dL (8.4-10.2)   02/05/19  05:46    


 


Total Bilirubin  3.20 mg/dL (0.1-1.2)  H  02/03/19  17:58    


 


AST  26 units/L (5-40)   02/03/19  17:58    


 


ALT  20 units/L (7-56)   02/03/19  17:58    


 


Alkaline Phosphatase  59 units/L ()   02/03/19  17:58    


 


Total Creatine Kinase  95 units/L ()   02/04/19  04:36    


 


CK-MB (CK-2)  1.6 ng/mL (0.0-4.0)   02/04/19  04:36    


 


CK-MB (CK-2) Rel Index  1.6  (0-4)   02/04/19  04:36    


 


Troponin T  < 0.010 ng/mL (0.00-0.029)   02/04/19  04:36    


 


NT-Pro-B Natriuret Pep  8051 pg/mL (0-450)  H  02/03/19  18:06    


 


Total Protein  8.0 g/dL (6.3-8.2)   02/03/19  17:58    


 


Albumin  4.0 g/dL (3.9-5)   02/03/19  17:58    


 


Albumin/Globulin Ratio  1.0 %  02/03/19  17:58    


 


Urine Color  Yellow  (Yellow)   02/04/19  23:00    


 


Urine Turbidity  Clear  (Clear)   02/04/19  23:00    


 


Urine pH  5.0  (5.0-7.0)   02/04/19  23:00    


 


Ur Specific Gravity  1.024  (1.003-1.030)   02/04/19  23:00    


 


Urine Protein  <15 mg/dl mg/dL (Negative)   02/04/19  23:00    


 


Urine Glucose (UA)  Neg mg/dL (Negative)   02/04/19  23:00    


 


Urine Ketones  Neg mg/dL (Negative)   02/04/19  23:00    


 


Urine Blood  Neg  (Negative)   02/04/19  23:00    


 


Urine Nitrite  Neg  (Negative)   02/04/19  23:00    


 


Urine Bilirubin  Neg  (Negative)   02/04/19  23:00    


 


Urine Urobilinogen  4.0 mg/dL (<2.0)   02/04/19  23:00    


 


Ur Leukocyte Esterase  Neg  (Negative)   02/04/19  23:00    


 


Urine WBC (Auto)  < 1.0 /HPF (0.0-6.0)   02/04/19  23:00    


 


Urine RBC (Auto)  2.0 /HPF (0.0-6.0)   02/04/19  23:00    


 


U Epithel Cells (Auto)  2.0 /HPF (0-13.0)   02/04/19  23:00    


 


Urine Mucus  Few /HPF  02/04/19  23:00

## 2019-02-06 LAB
BASOPHILS # (AUTO): 0 K/MM3 (ref 0–0.1)
BASOPHILS NFR BLD AUTO: 0.2 % (ref 0–1.8)
BUN SERPL-MCNC: 10 MG/DL (ref 7–17)
BUN/CREAT SERPL: 13 %
CALCIUM SERPL-MCNC: 8.4 MG/DL (ref 8.4–10.2)
EOSINOPHIL # BLD AUTO: 0.1 K/MM3 (ref 0–0.4)
EOSINOPHIL NFR BLD AUTO: 2.1 % (ref 0–4.3)
HCT VFR BLD CALC: 30.7 % (ref 30.3–42.9)
HEMOLYSIS INDEX: 3
HGB BLD-MCNC: 9.2 GM/DL (ref 10.1–14.3)
LYMPHOCYTES # BLD AUTO: 1.1 K/MM3 (ref 1.2–5.4)
LYMPHOCYTES NFR BLD AUTO: 18.6 % (ref 13.4–35)
MCHC RBC AUTO-ENTMCNC: 30 % (ref 30–34)
MCV RBC AUTO: 68 FL (ref 79–97)
MONOCYTES # (AUTO): 0.5 K/MM3 (ref 0–0.8)
MONOCYTES % (AUTO): 8.6 % (ref 0–7.3)
PLATELET # BLD: 174 K/MM3 (ref 140–440)
RBC # BLD AUTO: 4.53 M/MM3 (ref 3.65–5.03)

## 2019-02-06 RX ADMIN — Medication SCH ML: at 09:26

## 2019-02-06 RX ADMIN — FUROSEMIDE SCH: 10 INJECTION, SOLUTION INTRAVENOUS at 05:38

## 2019-02-06 RX ADMIN — CARVEDILOL SCH MG: 6.25 TABLET, FILM COATED ORAL at 09:24

## 2019-02-06 RX ADMIN — FUROSEMIDE SCH: 10 INJECTION, SOLUTION INTRAVENOUS at 18:22

## 2019-02-06 RX ADMIN — ENOXAPARIN SODIUM SCH MG: 100 INJECTION SUBCUTANEOUS at 09:25

## 2019-02-06 RX ADMIN — ASPIRIN SCH MG: 81 TABLET, CHEWABLE ORAL at 09:24

## 2019-02-06 RX ADMIN — MILRINONE LACTATE IN DEXTROSE SCH MLS/HR: 200 INJECTION, SOLUTION INTRAVENOUS at 09:20

## 2019-02-06 RX ADMIN — SPIRONOLACTONE SCH MG: 25 TABLET ORAL at 09:24

## 2019-02-06 RX ADMIN — Medication SCH ML: at 21:47

## 2019-02-06 RX ADMIN — LISINOPRIL SCH MG: 5 TABLET ORAL at 09:24

## 2019-02-06 RX ADMIN — CARVEDILOL SCH: 6.25 TABLET, FILM COATED ORAL at 21:47

## 2019-02-06 NOTE — PROGRESS NOTE
Assessment and Plan





Acute systolic heart failure


   s/t to noncompliance with medications


Ascities, moderate on CTA


Hypokalemia





An echocardiogram demonstrates an end-stage dilated cardiomyopathy, with left 

ventricle ejection fraction less than 10%. 





Continue aggressive medical therapy for systolic heart failure to include a 

trial of intravenous milrinone for an additional 24hrs.








Subjective


Date of service: 02/06/19


Interval history: 





Patient has no complaints. Admits she is diuresing well.


4 beat NSVT seen on telemetry overnight.





Objective


                                   Vital Signs











  Temp Pulse Resp BP BP Pulse Ox


 


 02/06/19 08:43  98.3 F  90  16  105/72   99


 


 02/06/19 04:36  97.0 F L  88  18  103/67   99


 


 02/06/19 01:07       94


 


 02/05/19 23:35  98.0 F  89  18  90/62   52 L


 


 02/05/19 20:43   71    


 


 02/05/19 19:40  98.0 F   18  99/59  


 


 02/05/19 15:55  97.9 F   18   


 


 02/05/19 15:53  97.9 F  71  18  91/55   99


 


 02/05/19 11:15  97.8 F  91 H  20   100/59 














- Physical Examination


General: No Apparent Distress


HEENT: Positive: PERRL


Neck: Positive: trachea midline


Cardiac: Positive: Reg Rate and Rhythm


Lungs: Positive: Decreased Breath Sounds


Neuro: Positive: Grossly Intact


Extremities: Present: +1 Edema





- Labs and Meds


                                       CBC











  02/06/19 Range/Units





  04:33 


 


WBC  5.9  (4.5-11.0)  K/mm3


 


RBC  4.53  (3.65-5.03)  M/mm3


 


Hgb  9.2 L  (10.1-14.3)  gm/dl


 


Hct  30.7  (30.3-42.9)  %


 


Plt Count  174  (140-440)  K/mm3


 


Lymph #  1.1 L  (1.2-5.4)  K/mm3


 


Mono #  0.5  (0.0-0.8)  K/mm3


 


Eos #  0.1  (0.0-0.4)  K/mm3


 


Baso #  0.0  (0.0-0.1)  K/mm3








                          Comprehensive Metabolic Panel











  02/06/19 Range/Units





  04:33 


 


Sodium  137  (137-145)  mmol/L


 


Potassium  3.7  (3.6-5.0)  mmol/L


 


Chloride  98.6  ()  mmol/L


 


Carbon Dioxide  25  (22-30)  mmol/L


 


BUN  10  (7-17)  mg/dL


 


Creatinine  0.8  (0.7-1.2)  mg/dL


 


Glucose  84  ()  mg/dL


 


Calcium  8.4  (8.4-10.2)  mg/dL

## 2019-02-06 NOTE — PROGRESS NOTE
Assessment and Plan


Assessment and plan: 


Patient is a 49 yo woman history of CHF, hypertension who presents to [facility] ED 

with worsening shortness of breath and worsening lower extremity edema, 


Also complains  orthopnea.  Worsening effort tolerance   





--Hypomagnesemia; replace per protocol and monitor levels





--Hypokalemia ; corrected





--Acute on chronic systolic heart failure; mild improvement


Continue anti-failure medications,LVEF: 10-15 %


Inotrops ,On milrinone drip per cardiology total 72 hours, finishing tomorrow





--Dilated cardiomyopathy; continue current anti-failure medications


Low-sodium diet, fluid restriction, cardiology following





--Moderate ascites; due to fluid overload


Continue diuretics, anti-failure medications





--Hypertension; well controlled


Continue current antihypertensives and when necessary medications





--DVT prophylaxis; Lovenox





--Physical therapy as tolerated after milrinone drip is completed





Possible discharge tomorrow if stable


Plan of care is reviewed with the patient and her nurse








History


Interval history: 


Patient seen and examined medical records reviewed


Admitted with worsening shortness of breath and worsening leg edema


Severe systolic congestive heart failure EF 10-15%


Cardiology recommended milrinone drip for 72 hours


Today's date to


Patient not in acute distress


Vital signs noted








Hospitalist Physical





- Constitutional


Vitals: 


                                        











Temp Pulse Resp BP Pulse Ox


 


 97.7 F   95 H  16   93/66   98 


 


 02/06/19 17:03  02/06/19 17:03  02/06/19 17:03  02/06/19 17:03  02/06/19 17:03











General appearance: Present: mild distress, well-nourished, obese





- EENT


Eyes: Present: PERRL, EOM intact





- Neck


Neck: Present: supple, normal ROM





- Respiratory


Respiratory effort: normal


Respiratory: bilateral: diminished, rales, negative: rhonchi, wheezing





- Cardiovascular


Rhythm: regular


Heart Sounds: Present: S1 & S2





- Extremities


Extremities: no ischemia, No edema





- Abdominal


General gastrointestinal: soft, non-tender, non-distended, normal bowel sounds





- Integumentary


Integumentary: Present: clear, warm





- Psychiatric


Psychiatric: appropriate mood/affect, cooperative





- Neurologic


Neurologic: CNII-XII intact, moves all extremities





Results





- Labs


CBC & Chem 7: 


                                 02/06/19 04:33





                                 02/06/19 04:33


Labs: 


                             Laboratory Last Values











WBC  5.9 K/mm3 (4.5-11.0)   02/06/19  04:33    


 


RBC  4.53 M/mm3 (3.65-5.03)   02/06/19  04:33    


 


Hgb  9.2 gm/dl (10.1-14.3)  L  02/06/19  04:33    


 


Hct  30.7 % (30.3-42.9)   02/06/19  04:33    


 


MCV  68 fl (79-97)  L  02/06/19  04:33    


 


MCH  20 pg (28-32)  L  02/06/19  04:33    


 


MCHC  30 % (30-34)   02/06/19  04:33    


 


RDW  23.1 % (13.2-15.2)  H  02/06/19  04:33    


 


Plt Count  174 K/mm3 (140-440)   02/06/19  04:33    


 


Lymph % (Auto)  18.6 % (13.4-35.0)   02/06/19  04:33    


 


Mono % (Auto)  8.6 % (0.0-7.3)  H  02/06/19  04:33    


 


Eos % (Auto)  2.1 % (0.0-4.3)   02/06/19  04:33    


 


Baso % (Auto)  0.2 % (0.0-1.8)   02/06/19  04:33    


 


Lymph #  1.1 K/mm3 (1.2-5.4)  L  02/06/19  04:33    


 


Mono #  0.5 K/mm3 (0.0-0.8)   02/06/19  04:33    


 


Eos #  0.1 K/mm3 (0.0-0.4)   02/06/19  04:33    


 


Baso #  0.0 K/mm3 (0.0-0.1)   02/06/19  04:33    


 


Seg Neutrophils %  70.5 % (40.0-70.0)  H  02/06/19  04:33    


 


Seg Neutrophils #  4.1 K/mm3 (1.8-7.7)   02/06/19  04:33    


 


D-Dimer  1952.64 ng/mlDDU (0-234)  H  02/03/19  17:58    


 


Sodium  137 mmol/L (137-145)   02/06/19  04:33    


 


Potassium  3.7 mmol/L (3.6-5.0)   02/06/19  04:33    


 


Chloride  98.6 mmol/L ()   02/06/19  04:33    


 


Carbon Dioxide  25 mmol/L (22-30)   02/06/19  04:33    


 


Anion Gap  17 mmol/L  02/06/19  04:33    


 


BUN  10 mg/dL (7-17)   02/06/19  04:33    


 


Creatinine  0.8 mg/dL (0.7-1.2)   02/06/19  04:33    


 


Estimated GFR  > 60 ml/min  02/06/19  04:33    


 


BUN/Creatinine Ratio  13 %  02/06/19  04:33    


 


Glucose  84 mg/dL ()   02/06/19  04:33    


 


Calcium  8.4 mg/dL (8.4-10.2)   02/06/19  04:33    


 


Magnesium  1.50 mg/dL (1.7-2.3)  L  02/06/19  04:33    


 


Total Bilirubin  3.20 mg/dL (0.1-1.2)  H  02/03/19  17:58    


 


AST  26 units/L (5-40)   02/03/19  17:58    


 


ALT  20 units/L (7-56)   02/03/19  17:58    


 


Alkaline Phosphatase  59 units/L ()   02/03/19  17:58    


 


Total Creatine Kinase  95 units/L ()   02/04/19  04:36    


 


CK-MB (CK-2)  1.6 ng/mL (0.0-4.0)   02/04/19  04:36    


 


CK-MB (CK-2) Rel Index  1.6  (0-4)   02/04/19  04:36    


 


Troponin T  < 0.010 ng/mL (0.00-0.029)   02/04/19  04:36    


 


NT-Pro-B Natriuret Pep  8051 pg/mL (0-450)  H  02/03/19  18:06    


 


Total Protein  8.0 g/dL (6.3-8.2)   02/03/19  17:58    


 


Albumin  4.0 g/dL (3.9-5)   02/03/19  17:58    


 


Albumin/Globulin Ratio  1.0 %  02/03/19  17:58    


 


Urine Color  Yellow  (Yellow)   02/04/19  23:00    


 


Urine Turbidity  Clear  (Clear)   02/04/19  23:00    


 


Urine pH  5.0  (5.0-7.0)   02/04/19  23:00    


 


Ur Specific Gravity  1.024  (1.003-1.030)   02/04/19  23:00    


 


Urine Protein  <15 mg/dl mg/dL (Negative)   02/04/19  23:00    


 


Urine Glucose (UA)  Neg mg/dL (Negative)   02/04/19  23:00    


 


Urine Ketones  Neg mg/dL (Negative)   02/04/19  23:00    


 


Urine Blood  Neg  (Negative)   02/04/19  23:00    


 


Urine Nitrite  Neg  (Negative)   02/04/19  23:00    


 


Urine Bilirubin  Neg  (Negative)   02/04/19  23:00    


 


Urine Urobilinogen  4.0 mg/dL (<2.0)   02/04/19  23:00    


 


Ur Leukocyte Esterase  Neg  (Negative)   02/04/19  23:00    


 


Urine WBC (Auto)  < 1.0 /HPF (0.0-6.0)   02/04/19  23:00    


 


Urine RBC (Auto)  2.0 /HPF (0.0-6.0)   02/04/19  23:00    


 


U Epithel Cells (Auto)  2.0 /HPF (0-13.0)   02/04/19  23:00    


 


Urine Mucus  Few /HPF  02/04/19  23:00

## 2019-02-07 VITALS — SYSTOLIC BLOOD PRESSURE: 99 MMHG | DIASTOLIC BLOOD PRESSURE: 75 MMHG

## 2019-02-07 RX ADMIN — SPIRONOLACTONE SCH: 25 TABLET ORAL at 12:34

## 2019-02-07 RX ADMIN — ASPIRIN SCH MG: 81 TABLET, CHEWABLE ORAL at 09:30

## 2019-02-07 RX ADMIN — SPIRONOLACTONE SCH MG: 25 TABLET ORAL at 16:56

## 2019-02-07 RX ADMIN — LISINOPRIL SCH: 5 TABLET ORAL at 12:34

## 2019-02-07 RX ADMIN — ENOXAPARIN SODIUM SCH MG: 100 INJECTION SUBCUTANEOUS at 09:30

## 2019-02-07 RX ADMIN — CARVEDILOL SCH: 6.25 TABLET, FILM COATED ORAL at 12:34

## 2019-02-07 RX ADMIN — FUROSEMIDE SCH: 10 INJECTION, SOLUTION INTRAVENOUS at 17:30

## 2019-02-07 RX ADMIN — FUROSEMIDE SCH MG: 10 INJECTION, SOLUTION INTRAVENOUS at 06:07

## 2019-02-07 RX ADMIN — MILRINONE LACTATE IN DEXTROSE SCH MLS/HR: 200 INJECTION, SOLUTION INTRAVENOUS at 00:43

## 2019-02-07 RX ADMIN — Medication SCH ML: at 09:31

## 2019-02-07 RX ADMIN — FUROSEMIDE SCH MG: 10 INJECTION, SOLUTION INTRAVENOUS at 16:56

## 2019-02-07 NOTE — DISCHARGE SUMMARY
Providers





- Providers


Date of Admission: 


02/03/19 22:24





Date of discharge: 02/07/19


Attending physician: 


AMY J KOCHERLA





                                        





02/03/19 22:24


Consult to Physician [CONS] Routine 


   Comment: 


   Consulting Provider: TARAH ABREU


   Physician Instructions: 


   Reason For Exam: chf











Primary care physician: 


CAMERON BARROS








Hospitalization


Reason for admission: worsening shortness of breath/worsening leg edema


Condition: Fair


Pertinent studies: 


Chest x-ray; prominent enlargement of cardiac silhouette, pericardial effusion 

not excluded, lungs clear


Echocardiogram; left ventricle ejection fraction less than 10-15%


Moderate to severe TR


Mild to moderate MR


Mild AR, mild pulmonary hypertension


CTA chest; no PE, cardiomegaly with mild pericardial effusion


Minimal basal congestion, diffuse abdominal wall edema, moderate ascites





Hospital course: 


Patient is a 49 yo woman history of CHF, hypertension was admitted through ED 

with worsening shortness of breath 


and worsening lower extremity edema, Also complains  orthopnea.  Worsening 

effort tolerance.  Patient was not noted 


to be in severe cardiomyopathy with ejection fraction of 10-15%Patient was 

evaluated by cardiology, medications optimized, 


started on inotropes milrinone drip for 72 hours.Patient strongly advised to 

comply with medications diet and follow-up visits,


 patient's symptoms significantly improvedToday's comfortable alert awake 

oriented vital signs noted


Physical examination prior to discharge did not show any new changes


Advised to follow with cardiologist at [facility] in 3-4 days' time


Patient also advised low-sodium diet and fluid restriction


Patient verbalized understanding, stable at discharge  


May need cardiology evaluation for the AICD placement








Discharge diagnosis;


--Hypomagnesemia; corrected


--Hypokalemia ; corrected


--Acute on chronic systolic heart failure; mild improvement


  on anti-failure medications,LVEF: 10-15 %


   Received milrinone drip per cardiology total 72 hours, 


--Dilated cardiomyopathy; continue current anti-failure medications


   Low-sodium diet, fluid restriction, cardiology following


--Moderate ascites; due to fluid overload


   Continue diuretics, anti-failure medications


--Hypertension; patient's pressures are running low


    Closely monitor





Patient is stable at discharge


Disposition: DC-01 TO HOME OR SELFCARE


Time spent for discharge: 33 min





Core Measure Documentation





- Palliative Care


Palliative Care/ Comfort Measures: Not Applicable





- Core Measures


Any of the following diagnoses?: heart failure





- Heart Failure Discharge Requirements


ACE/ARB for LVSD if EF <40%: Yes


Beta blocker at discharge: Yes





Exam





- Constitutional


Vitals: 


                                        











Temp Pulse Resp BP Pulse Ox


 


 97.9 F   85   18   95/66   100 


 


 02/07/19 12:58  02/07/19 12:58  02/07/19 12:58  02/07/19 12:58  02/07/19 12:58











General appearance: Present: no acute distress, well-nourished





- EENT


Eyes: Present: PERRL, EOM intact





- Neck


Neck: Present: supple, normal ROM





- Respiratory


Respiratory effort: normal


Respiratory: bilateral: diminished, rales, negative: rhonchi, wheezing





- Cardiovascular


Rhythm: regular


Heart Sounds: Present: S1 & S2





- Extremities


Extremities: no ischemia, No edema





- Abdominal


General gastrointestinal: Present: soft, non-tender, non-distended, normal bowel

 sounds





- Integumentary


Integumentary: Present: clear, warm





- Musculoskeletal


Musculoskeletal: strength equal bilaterally





- Psychiatric


Psychiatric: appropriate mood/affect, cooperative





- Neurologic


Neurologic: CNII-XII intact, moves all extremities





Plan


Activity: advance as tolerated


Diet: other (cardiac diet)


Additional Instructions: Advised to follow private cardiologist at [place] in 3-5 

days.  Strongly advised to comply with medications, diet, follow-up visits.  

Low-sodium diet, fluid restriction.  If you have chest pain or shortness of 

breath, contact M.D. or go to emergency room


Follow up with: 


CAMERON BARROS MD [Primary Care Provider] - 3-5 Days


TARAH ABREU MD [Staff Physician] - 3 Days


Prescriptions: 


Furosemide [Lasix TAB] 20 mg PO DAILY #30 tablet


Lisinopril [Zestril TAB] 2.5 mg PO QDAY #30 tab


Spironolactone [Aldactone] 25 mg PO QDAY #30 tablet

## 2019-02-07 NOTE — PROGRESS NOTE
Addendum entered and electronically signed by TARAH ABREU MD  02/07/19 11:31:







Cardiac status is stable, heart failure is optimally compensated.  Okay to 

discontinue IV milrinone, discharge on guidelines directed medical therapy for 

outpatient follow-up in 3-5 days.





Original Note:








Assessment and Plan





Acute systolic heart failure


   s/t to noncompliance with medications


Ascities, moderate on CTA


Hypokalemia





An echocardiogram demonstrates an end-stage dilated cardiomyopathy, with left 

ventricle ejection fraction less than 10%. 





Continue medical therapy for systolic heart failure.


Stable cardiac wise. 


Outpatient evaluation for indwelling cardiac defibrillator for primary 

prevention. Once discharged, patient advised to follow up with her cardiologist 

at [place] within 3-5 days.





Subjective


Date of service: 02/07/19


Interval history: 





Patient reports her breathing is better.


No reported events on telemetry overnight.





Objective


                                   Vital Signs











  Temp Pulse Resp BP Pulse Ox


 


 02/07/19 08:36  99.2 F  83  17  102/67  99


 


 02/07/19 04:02  97.7 F  94 H  17  110/76  97


 


 02/06/19 23:52  98.3 F  95 H  17  103/72  99


 


 02/06/19 20:05   84   


 


 02/06/19 19:19  98.0 F  84  17  102/61  98


 


 02/06/19 17:03  97.7 F  95 H  16  93/66  98


 


 02/06/19 11:49  97.9 F  86  16  90/62  98














- Physical Examination


General: No Apparent Distress


HEENT: Positive: PERRL


Neck: Positive: trachea midline


Cardiac: Positive: Reg Rate and Rhythm


Lungs: Positive: Decreased Breath Sounds


Neuro: Positive: Grossly Intact


Extremities: Present: +1 Edema